# Patient Record
Sex: FEMALE | URBAN - METROPOLITAN AREA
[De-identification: names, ages, dates, MRNs, and addresses within clinical notes are randomized per-mention and may not be internally consistent; named-entity substitution may affect disease eponyms.]

---

## 2024-08-29 ENCOUNTER — OFFICE VISIT (OUTPATIENT)
Dept: URBAN - METROPOLITAN AREA CLINIC 78 | Facility: CLINIC | Age: 89
End: 2024-08-29
Payer: MEDICARE

## 2024-08-29 ENCOUNTER — DASHBOARD ENCOUNTERS (OUTPATIENT)
Age: 89
End: 2024-08-29

## 2024-08-29 VITALS
HEIGHT: 65 IN | BODY MASS INDEX: 20.83 KG/M2 | DIASTOLIC BLOOD PRESSURE: 72 MMHG | WEIGHT: 125 LBS | RESPIRATION RATE: 14 BRPM | TEMPERATURE: 98.1 F | SYSTOLIC BLOOD PRESSURE: 142 MMHG | HEART RATE: 65 BPM

## 2024-08-29 DIAGNOSIS — K59.09 OTHER CONSTIPATION: ICD-10-CM

## 2024-08-29 DIAGNOSIS — R74.8 ELEVATED LIPASE: ICD-10-CM

## 2024-08-29 DIAGNOSIS — R11.0 NAUSEA: ICD-10-CM

## 2024-08-29 DIAGNOSIS — M54.50 LOW BACK PAIN, UNSPECIFIED BACK PAIN LATERALITY, UNSPECIFIED CHRONICITY, UNSPECIFIED WHETHER SCIATICA PRESENT: ICD-10-CM

## 2024-08-29 PROCEDURE — 99203 OFFICE O/P NEW LOW 30 MIN: CPT

## 2024-08-29 RX ORDER — LEVOTHYROXINE SODIUM 50 UG/1
1 TABLET IN THE MORNING ON AN EMPTY STOMACH TABLET ORAL ONCE A DAY
Status: ACTIVE | COMMUNITY

## 2024-08-29 NOTE — HPI-TODAY'S VISIT:
Patient was recently evaluated at [facility] on 8/22/2020 for evaluation of 2-week history of nausea that have been worsening over the past 3 to 4 days and a gnawing feeling in her epigastric region.  She reports that weeks ago she was placed on a Medrol Dosepak which is around the time her symptoms started.  She reported diffuse abdominal pain without a change in bowel habits.   She is noted to have a history of breast cancer s/p lumpectomy, hypothyroidism.  CBC at this time was WNL, CMP was WNL, UA was unremarkable.  She had a slightly elevated lipase of 75.  Respiratory viral panel was negative. She underwent a CT A/P with contrast on 8/22/2024 which showed no definite acute abnormality identified, questionable mildly prominent intrahepatic bile ducts within the left hepatic lobe, likely present on previous exam, probable small sliding-type hiatal hernia, probable calcified fibroid. She then underwent a RUQ US which was unremarkable. She also had an x-ray of her chest which was unremarkable.    She was prescribed Zofran 4 mg, pantoprazole 40 mg daily, and Maalox 20 mL 4 times daily advised to follow-up with GI for further evaluation.   She reports that she started taking pantoprazole for 3 days however this caused her to have itchy joint so she had to discontinue this medication.  She did take the Maalox as prescribed however this caused her to have diarrhea.  She then switched to Gaviscon which she reports she has been having formed bowel movements.  She has not taken the Zofran for nausea however reports that if she eats her nausea resolves.  She denies GERD like symptoms or dysphagia.  She does report that she has a lower back pain that feels as though something is pressing on her back.  She reports that when she has a bowel movement this resolves.  This lower back pain goes from her lower back and radiates down her legs, causing her to have difficulty walking.  She has been evaluated by Dr. JENNY Huber M.D. with [facility] who diagnosed her with arthritis after undergoing an x-ray.  She does have a follow-up with him in about a week and may pursue an MRI of her spine and back at this time for further evaluation of this pain.  She describes it as a sore ache and typically hurts when she is sitting for prolonged period of time.  She does report that she has been having good bowel movements which she feels completely evacuated.  She has had 2 bowel movements a day denies hematochezia or melena.  She reports that her last colonoscopy and EGD were years ago but they were normal.  She also complains of right upper quadrant abdominal pain however attributes this to pressure from her bra.  She states that when she takes off her bra that this resolves.  This pressure can also resolve after having a bowel movement.

## 2024-09-08 ENCOUNTER — LAB OUTSIDE AN ENCOUNTER (OUTPATIENT)
Dept: URBAN - METROPOLITAN AREA CLINIC 78 | Facility: CLINIC | Age: 89
End: 2024-09-08

## 2024-10-10 ENCOUNTER — OFFICE VISIT (OUTPATIENT)
Dept: URBAN - METROPOLITAN AREA CLINIC 78 | Facility: CLINIC | Age: 89
End: 2024-10-10